# Patient Record
Sex: FEMALE | Race: WHITE | NOT HISPANIC OR LATINO | Employment: OTHER | URBAN - METROPOLITAN AREA
[De-identification: names, ages, dates, MRNs, and addresses within clinical notes are randomized per-mention and may not be internally consistent; named-entity substitution may affect disease eponyms.]

---

## 2017-10-13 ENCOUNTER — NEW PATIENT COMPREHENSIVE (OUTPATIENT)
Dept: URBAN - METROPOLITAN AREA CLINIC 43 | Facility: CLINIC | Age: 62
End: 2017-10-13

## 2017-10-13 DIAGNOSIS — H25.093: ICD-10-CM

## 2017-10-13 DIAGNOSIS — H40.033: ICD-10-CM

## 2017-10-13 PROCEDURE — 92015 DETERMINE REFRACTIVE STATE: CPT

## 2017-10-13 PROCEDURE — 92004 COMPRE OPH EXAM NEW PT 1/>: CPT

## 2017-10-13 ASSESSMENT — TONOMETRY
OD_IOP_MMHG: 18
OS_IOP_MMHG: 18

## 2017-10-13 ASSESSMENT — VISUAL ACUITY
OD_CC: J1-1
OS_CC: J1-1
OS_SC: 20/80-2
OD_SC: J6+1
OS_SC: J6
OS_CC: 20/40+1
OD_SC: 20/80-2
OD_CC: 20/40

## 2020-08-04 ENCOUNTER — ESTABLISHED COMPREHENSIVE EXAM (OUTPATIENT)
Dept: URBAN - METROPOLITAN AREA CLINIC 43 | Facility: CLINIC | Age: 65
End: 2020-08-04

## 2020-08-04 DIAGNOSIS — H25.093: ICD-10-CM

## 2020-08-04 DIAGNOSIS — H40.033: ICD-10-CM

## 2020-08-04 PROCEDURE — 92015 DETERMINE REFRACTIVE STATE: CPT

## 2020-08-04 PROCEDURE — 92014 COMPRE OPH EXAM EST PT 1/>: CPT

## 2020-08-04 ASSESSMENT — VISUAL ACUITY
OD_CC: J3
OD_SC: 20/200
OS_CC: J2
OS_SC: 20/200+1
OD_SC: J12
OD_CC: 20/30-2
OS_CC: 20/30-2
OS_SC: J12

## 2020-08-04 ASSESSMENT — TONOMETRY
OS_IOP_MMHG: 17
OD_IOP_MMHG: 18

## 2021-11-12 NOTE — PATIENT DISCUSSION
Good response in IOP's  with Latanaprost qhs OU.  Patient to continue drops as indicated. RTC 4 mo for IOP check.

## 2022-09-23 NOTE — PATIENT DISCUSSION
Hx of high IOP's and FHx of Glaucoma (mother). Ocular Hypertension.  Patient prefers to stay on drops. Continue with Latanaprost qhs OU.

## 2022-11-16 ENCOUNTER — EMERGENCY VISIT (OUTPATIENT)
Dept: URBAN - METROPOLITAN AREA CLINIC 43 | Facility: CLINIC | Age: 67
End: 2022-11-16

## 2022-11-16 DIAGNOSIS — H04.123: ICD-10-CM

## 2022-11-16 DIAGNOSIS — H25.093: ICD-10-CM

## 2022-11-16 DIAGNOSIS — H10.33: ICD-10-CM

## 2022-11-16 PROCEDURE — 92012 INTRM OPH EXAM EST PATIENT: CPT

## 2022-11-16 ASSESSMENT — VISUAL ACUITY
OD_SC: 20/40
OS_SC: 20/60-2

## 2022-12-05 ENCOUNTER — COMPREHENSIVE EXAM (OUTPATIENT)
Dept: URBAN - METROPOLITAN AREA CLINIC 43 | Facility: CLINIC | Age: 67
End: 2022-12-05

## 2022-12-05 PROCEDURE — 92014 COMPRE OPH EXAM EST PT 1/>: CPT

## 2022-12-05 PROCEDURE — 92015 DETERMINE REFRACTIVE STATE: CPT

## 2022-12-05 ASSESSMENT — TONOMETRY
OD_IOP_MMHG: 15
OS_IOP_MMHG: 14

## 2022-12-05 ASSESSMENT — VISUAL ACUITY
OD_SC: 20/30
OS_CC: J1
OS_SC: J10-
OD_SC: J8-
OS_SC: 20/60-2
OS_CC: 20/30
OD_CC: 20/200
OD_CC: J1

## 2023-12-06 ENCOUNTER — COMPREHENSIVE EXAM (OUTPATIENT)
Dept: URBAN - METROPOLITAN AREA CLINIC 43 | Facility: CLINIC | Age: 68
End: 2023-12-06

## 2023-12-06 DIAGNOSIS — H04.123: ICD-10-CM

## 2023-12-06 DIAGNOSIS — H40.033: ICD-10-CM

## 2023-12-06 DIAGNOSIS — H25.13: ICD-10-CM

## 2023-12-06 PROCEDURE — 92014 COMPRE OPH EXAM EST PT 1/>: CPT

## 2023-12-06 ASSESSMENT — VISUAL ACUITY
OS_SC: 20/50+1
OS_CC: 20/20-2
OD_CC: 20/80
OD_SC: 20/50-1
OS_BAT: 20/400
OU_CC: 20/20
OD_BAT: 20/400
OD_SC: J2
OS_SC: J12
OU_SC: J2
OU_SC: 20/50

## 2023-12-06 ASSESSMENT — TONOMETRY
OS_IOP_MMHG: 17
OD_IOP_MMHG: 16